# Patient Record
Sex: MALE | Race: WHITE | NOT HISPANIC OR LATINO | RURAL
[De-identification: names, ages, dates, MRNs, and addresses within clinical notes are randomized per-mention and may not be internally consistent; named-entity substitution may affect disease eponyms.]

---

## 2018-01-31 ENCOUNTER — OFFICE VISIT (OUTPATIENT)
Dept: RETAIL CLINIC | Facility: CLINIC | Age: 23
End: 2018-01-31

## 2018-01-31 DIAGNOSIS — L03.032 CELLULITIS OF GREAT TOE, LEFT: Primary | ICD-10-CM

## 2018-01-31 PROCEDURE — 99203 OFFICE O/P NEW LOW 30 MIN: CPT | Performed by: NURSE PRACTITIONER

## 2018-02-05 RX ORDER — CEPHALEXIN 500 MG/1
500 CAPSULE ORAL 3 TIMES DAILY
Qty: 30 CAPSULE | Refills: 0
Start: 2018-02-05 | End: 2018-02-15

## 2018-02-05 NOTE — PATIENT INSTRUCTIONS
Cellulitis, Adult  Introduction  Cellulitis is a skin infection. The infected area is usually red and sore. This condition occurs most often in the arms and lower legs. It is very important to get treated for this condition.  Follow these instructions at home:  · Take over-the-counter and prescription medicines only as told by your doctor.  · If you were prescribed an antibiotic medicine, take it as told by your doctor. Do not stop taking the antibiotic even if you start to feel better.  · Drink enough fluid to keep your pee (urine) clear or pale yellow.  · Do not touch or rub the infected area.  · Raise (elevate) the infected area above the level of your heart while you are sitting or lying down.  · Place warm or cold wet cloths (warm or cold compresses) on the infected area. Do this as told by your doctor.  · Keep all follow-up visits as told by your doctor. This is important. These visits let your doctor make sure your infection is not getting worse.  Contact a doctor if:  · You have a fever.  · Your symptoms do not get better after 1-2 days of treatment.  · Your bone or joint under the infected area starts to hurt after the skin has healed.  · Your infection comes back. This can happen in the same area or another area.  · You have a swollen bump in the infected area.  · You have new symptoms.  · You feel ill and also have muscle aches and pains.  Get help right away if:  · Your symptoms get worse.  · You feel very sleepy.  · You throw up (vomit) or have watery poop (diarrhea) for a long time.  · There are red streaks coming from the infected area.  · Your red area gets larger.  · Your red area turns darker.  This information is not intended to replace advice given to you by your health care provider. Make sure you discuss any questions you have with your health care provider.  Document Released: 06/05/2009 Document Revised: 05/25/2017 Document Reviewed: 10/26/2016  © 2017 [employer]

## 2018-02-05 NOTE — PROGRESS NOTES
Internet outage at time patient was seen.  See scanned documents.      Diagnoses and all orders for this visit:    Cellulitis of great toe, left  -     cephalexin (KEFLEX) 500 MG capsule; Take 1 capsule by mouth 3 (Three) Times a Day for 10 days.